# Patient Record
Sex: MALE | Race: WHITE | URBAN - METROPOLITAN AREA
[De-identification: names, ages, dates, MRNs, and addresses within clinical notes are randomized per-mention and may not be internally consistent; named-entity substitution may affect disease eponyms.]

---

## 2020-05-22 ENCOUNTER — OFFICE VISIT (OUTPATIENT)
Dept: URBAN - METROPOLITAN AREA CLINIC 33 | Facility: CLINIC | Age: 80
End: 2020-05-22
Payer: COMMERCIAL

## 2020-05-22 DIAGNOSIS — H25.13 AGE-RELATED NUCLEAR CATARACT, BILATERAL: Primary | ICD-10-CM

## 2020-05-22 DIAGNOSIS — H43.811 VITREOUS DEGENERATION, RIGHT EYE: ICD-10-CM

## 2020-05-22 DIAGNOSIS — H18.453 BILATERAL NODULAR CORNEAL DEGENERATION: ICD-10-CM

## 2020-05-22 PROCEDURE — 99203 OFFICE O/P NEW LOW 30 MIN: CPT | Performed by: OPTOMETRIST

## 2020-05-22 ASSESSMENT — INTRAOCULAR PRESSURE
OD: 17
OS: 18

## 2020-05-22 ASSESSMENT — VISUAL ACUITY
OS: 20/30
OD: 20/20

## 2020-05-22 NOTE — IMPRESSION/PLAN
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 Plan: Salmanns nodule OU. Discussed diagnosis in detail with patient. No treatment is required at this time. Monitor condition yearly.

## 2022-02-04 ENCOUNTER — OFFICE VISIT (OUTPATIENT)
Dept: URBAN - METROPOLITAN AREA CLINIC 33 | Facility: CLINIC | Age: 82
End: 2022-02-04
Payer: COMMERCIAL

## 2022-02-04 PROCEDURE — 99213 OFFICE O/P EST LOW 20 MIN: CPT | Performed by: OPTOMETRIST

## 2022-02-04 ASSESSMENT — INTRAOCULAR PRESSURE
OD: 17
OS: 17

## 2022-02-04 ASSESSMENT — VISUAL ACUITY
OD: 20/30
OS: 20/30

## 2022-02-04 NOTE — IMPRESSION/PLAN
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 Patient reports history of removal many years ago. No treatment necessary at this time. Monitor only.

## 2023-01-30 ENCOUNTER — OFFICE VISIT (OUTPATIENT)
Dept: URBAN - METROPOLITAN AREA CLINIC 33 | Facility: CLINIC | Age: 83
End: 2023-01-30
Payer: COMMERCIAL

## 2023-01-30 DIAGNOSIS — H25.13 AGE-RELATED NUCLEAR CATARACT, BILATERAL: ICD-10-CM

## 2023-01-30 DIAGNOSIS — H04.123 DRY EYE SYNDROME OF BILATERAL LACRIMAL GLANDS: Primary | ICD-10-CM

## 2023-01-30 PROCEDURE — 99213 OFFICE O/P EST LOW 20 MIN: CPT | Performed by: OPTOMETRIST

## 2023-01-30 ASSESSMENT — VISUAL ACUITY
OS: 20/25
OD: 20/25

## 2023-01-30 ASSESSMENT — INTRAOCULAR PRESSURE
OS: 19
OD: 19

## 2023-01-30 NOTE — IMPRESSION/PLAN
Impression: Age-related nuclear cataract, bilateral: H25.13. Plan: Cataracts account for the patient's complaints. No treatment currently recommended. Patient declined glasses RX (no improvement to vision). Return yearly for dilation and follow up.

## 2023-01-30 NOTE — IMPRESSION/PLAN
Impression: Dry eye syndrome of bilateral lacrimal glands: H04.123. Plan: Advised patient foreign body sensation is due to dry eyes. Recommend patient starts Refresh tears throughout the day.

## 2024-02-22 ENCOUNTER — OFFICE VISIT (OUTPATIENT)
Dept: URBAN - METROPOLITAN AREA CLINIC 33 | Facility: CLINIC | Age: 84
End: 2024-02-22
Payer: COMMERCIAL

## 2024-02-22 DIAGNOSIS — H25.13 AGE-RELATED NUCLEAR CATARACT, BILATERAL: Primary | ICD-10-CM

## 2024-02-22 DIAGNOSIS — H18.453 BILATERAL NODULAR CORNEAL DEGENERATION: ICD-10-CM

## 2024-02-22 DIAGNOSIS — H04.123 DRY EYE SYNDROME OF BILATERAL LACRIMAL GLANDS: ICD-10-CM

## 2024-02-22 PROCEDURE — 99213 OFFICE O/P EST LOW 20 MIN: CPT

## 2024-02-22 ASSESSMENT — INTRAOCULAR PRESSURE
OD: 18
OS: 18

## 2024-02-22 ASSESSMENT — VISUAL ACUITY
OS: 20/40
OD: 20/30

## 2025-02-20 ENCOUNTER — OFFICE VISIT (OUTPATIENT)
Dept: URBAN - METROPOLITAN AREA CLINIC 33 | Facility: CLINIC | Age: 85
End: 2025-02-20
Payer: MEDICARE

## 2025-02-20 DIAGNOSIS — H25.13 AGE-RELATED NUCLEAR CATARACT, BILATERAL: Primary | ICD-10-CM

## 2025-02-20 DIAGNOSIS — D31.10: ICD-10-CM

## 2025-02-20 PROCEDURE — 92014 COMPRE OPH EXAM EST PT 1/>: CPT

## 2025-02-20 ASSESSMENT — INTRAOCULAR PRESSURE
OD: 21
OS: 21

## 2025-02-20 ASSESSMENT — VISUAL ACUITY
OD: 20/25
OS: 20/30

## 2025-03-21 ENCOUNTER — OFFICE VISIT (OUTPATIENT)
Dept: URBAN - METROPOLITAN AREA CLINIC 33 | Facility: CLINIC | Age: 85
End: 2025-03-21
Payer: MEDICARE

## 2025-03-21 DIAGNOSIS — C69.02 MALIGNANT NEOPLASM OF LEFT CONJUNCTIVA: Primary | ICD-10-CM

## 2025-03-21 PROCEDURE — 99203 OFFICE O/P NEW LOW 30 MIN: CPT | Performed by: OPHTHALMOLOGY

## 2025-03-21 RX ORDER — OFLOXACIN 3 MG/ML
0.3 % SOLUTION/ DROPS OPHTHALMIC
Qty: 5 | Refills: 1 | Status: ACTIVE
Start: 2025-03-21

## 2025-03-21 RX ORDER — PREDNISOLONE ACETATE 10 MG/ML
1 % SUSPENSION/ DROPS OPHTHALMIC
Qty: 5 | Refills: 1 | Status: ACTIVE
Start: 2025-03-21

## 2025-04-21 ENCOUNTER — POST-OPERATIVE VISIT (OUTPATIENT)
Dept: URBAN - METROPOLITAN AREA CLINIC 33 | Facility: CLINIC | Age: 85
End: 2025-04-21

## 2025-04-21 PROCEDURE — 99024 POSTOP FOLLOW-UP VISIT: CPT

## 2025-04-21 ASSESSMENT — INTRAOCULAR PRESSURE
OD: 16
OS: 18

## 2025-04-25 ENCOUNTER — POST-OPERATIVE VISIT (OUTPATIENT)
Dept: URBAN - METROPOLITAN AREA CLINIC 33 | Facility: CLINIC | Age: 85
End: 2025-04-25

## 2025-04-25 DIAGNOSIS — Z48.810 ENCOUNTER FOR SURGICAL AFTERCARE FOLLOWING SURGERY ON A SENSE ORGAN: Primary | ICD-10-CM

## 2025-04-25 PROCEDURE — 99024 POSTOP FOLLOW-UP VISIT: CPT | Performed by: OPHTHALMOLOGY

## 2025-04-25 ASSESSMENT — VISUAL ACUITY: OD: 20/25

## 2025-04-25 ASSESSMENT — INTRAOCULAR PRESSURE
OS: 17
OD: 21

## 2025-05-16 ENCOUNTER — POST-OPERATIVE VISIT (OUTPATIENT)
Dept: URBAN - METROPOLITAN AREA CLINIC 33 | Facility: CLINIC | Age: 85
End: 2025-05-16

## 2025-05-16 DIAGNOSIS — Z48.810 ENCOUNTER FOR SURGICAL AFTERCARE FOLLOWING SURGERY ON A SENSE ORGAN: Primary | ICD-10-CM

## 2025-05-16 PROCEDURE — 99024 POSTOP FOLLOW-UP VISIT: CPT | Performed by: OPHTHALMOLOGY

## 2025-05-16 ASSESSMENT — INTRAOCULAR PRESSURE
OS: 15
OD: 15